# Patient Record
Sex: FEMALE | Race: WHITE | NOT HISPANIC OR LATINO | Employment: UNEMPLOYED | URBAN - METROPOLITAN AREA
[De-identification: names, ages, dates, MRNs, and addresses within clinical notes are randomized per-mention and may not be internally consistent; named-entity substitution may affect disease eponyms.]

---

## 2018-10-18 ENCOUNTER — OFFICE VISIT (OUTPATIENT)
Dept: INTERNAL MEDICINE | Facility: MEDICAL CENTER | Age: 33
End: 2018-10-18
Payer: MEDICAID

## 2018-10-18 VITALS
TEMPERATURE: 97 F | HEART RATE: 73 BPM | BODY MASS INDEX: 29.18 KG/M2 | WEIGHT: 197 LBS | DIASTOLIC BLOOD PRESSURE: 81 MMHG | OXYGEN SATURATION: 99 % | SYSTOLIC BLOOD PRESSURE: 126 MMHG | HEIGHT: 69 IN

## 2018-10-18 DIAGNOSIS — F31.9 BIPOLAR 1 DISORDER (HCC): ICD-10-CM

## 2018-10-18 DIAGNOSIS — Z23 NEED FOR VACCINATION: ICD-10-CM

## 2018-10-18 DIAGNOSIS — Z11.9 SCREENING EXAMINATION FOR INFECTIOUS DISEASE: ICD-10-CM

## 2018-10-18 DIAGNOSIS — L73.9 FOLLICULITIS: ICD-10-CM

## 2018-10-18 DIAGNOSIS — Z13.228 SCREENING FOR METABOLIC DISORDER: ICD-10-CM

## 2018-10-18 DIAGNOSIS — E66.3 OVERWEIGHT (BMI 25.0-29.9): ICD-10-CM

## 2018-10-18 DIAGNOSIS — B02.9 HERPES ZOSTER WITHOUT COMPLICATION: ICD-10-CM

## 2018-10-18 PROCEDURE — 90471 IMMUNIZATION ADMIN: CPT | Performed by: INTERNAL MEDICINE

## 2018-10-18 PROCEDURE — 99204 OFFICE O/P NEW MOD 45 MIN: CPT | Mod: GC,25 | Performed by: INTERNAL MEDICINE

## 2018-10-18 PROCEDURE — 90686 IIV4 VACC NO PRSV 0.5 ML IM: CPT | Performed by: INTERNAL MEDICINE

## 2018-10-18 ASSESSMENT — PATIENT HEALTH QUESTIONNAIRE - PHQ9
SUM OF ALL RESPONSES TO PHQ QUESTIONS 1-9: 8
CLINICAL INTERPRETATION OF PHQ2 SCORE: 2
5. POOR APPETITE OR OVEREATING: 1 - SEVERAL DAYS

## 2018-10-18 NOTE — NON-PROVIDER
"Viki Foster is a 33 y.o. female here for a non-provider visit for:   FLU    Reason for immunization: Annual Flu Vaccine  Immunization records indicate need for vaccine: Yes, confirmed with Epic  Minimum interval has been met for this vaccine: Yes  ABN completed: Not Indicated    Order and dose verified by: ABDULLAHI   VIS Dated  8/7/15 was given to patient: Yes  All IAC Questionnaire questions were answered \"No.\"    Patient tolerated injection and no adverse effects were observed or reported: Yes    Pt scheduled for next dose in series: Not Indicated    "

## 2018-10-18 NOTE — ASSESSMENT & PLAN NOTE
Patient over weight and has trouble losing the extra pounds.  - get CMP, TSH, and T4  - counseled patient on diet and exersice

## 2018-10-18 NOTE — ASSESSMENT & PLAN NOTE
Diagnosed 6-8 weeks ago at [facility] ED. Currently healing well. Has occasional itching, rarely pain. Symptoms are tolerable.  - CTM  - advised patient to use OTC lidocaine ointment PRN

## 2018-10-18 NOTE — ASSESSMENT & PLAN NOTE
Folliculitis beneath bilateral arms. Likely bacterial.  - start mupirocin ointment to bilateral underarms BID

## 2018-10-18 NOTE — ASSESSMENT & PLAN NOTE
Diagnosed as a child. Has not been on medications or seen psychiatry as an adult. Currently PHQ9 score is 8. Patient is interested in starting treatment for depression.  - referral to psychiatry provided

## 2018-10-18 NOTE — PROGRESS NOTES
New Patient to Establish    Reason to establish: New patient to establish    CC: Follow-up for herpes zoster. Under arm folliculitis. Depression.    HPI: Patient is a pleasant 33-year-old female without any diagnosis chronic health conditions. About 8 weeks ago, the patient reports that she was diagnosed with shingles in [facility] emergency room. At that time, the patient was given a prescription for gabapentin for neuropathic pain. Currently, the patient reports that her symptoms are much improved. The lesions have healed. Patient reports occasional shooting pain in the area of the lesion and occasional itching. Patient states that she just escamilla through it and it doesn't bother her much.    Patient is also concern for underarm lesion that she's had for about 6 weeks now. Patient reports that they're painful and itchy. Patient denies changes in deodorant, shower gel, shaving cream. She reports that she changes her razor on a regular basis. Has not tried anything for treatment.    Patient would also like to be started on something for depression. She reports that she was diagnosed with bipolar disorder as a child and was on Ritalin for short period of time. Patient has not seen a psychiatrist as an adult. She scoring 8 on the PHQ 9 which is which correlates with mild depression.    Patient reports that she is generally in good health and has no concern for any sexual transmitted infections. Patient is never been screened for HIV or has any recent labs done.    Patient Active Problem List    Diagnosis Date Noted   • Herpes zoster without complication 10/18/2018   • Bipolar 1 disorder (HCC) 10/18/2018   • Folliculitis 10/18/2018   • Need for vaccination 10/18/2018   • Screening examination for infectious disease 10/18/2018   • Overweight (BMI 25.0-29.9) 10/18/2018   • Screening for metabolic disorder 10/18/2018       Past Medical History:   Diagnosis Date   • Psychiatric disorder     bipolar       Current  "Outpatient Prescriptions   Medication Sig Dispense Refill   • mupirocin (BACTROBAN) 2 % Ointment Apply 1 g to affected area(s) 2 times a day. 1 Tube 0     No current facility-administered medications for this visit.        Allergies as of 10/18/2018   • (No Known Allergies)       Social History     Social History   • Marital status: Legally      Spouse name: N/A   • Number of children: N/A   • Years of education: N/A     Occupational History   • Not on file.     Social History Main Topics   • Smoking status: Current Every Day Smoker     Packs/day: 0.25     Years: 4.00     Types: Cigarettes   • Smokeless tobacco: Never Used      Comment: one to two months to finish a pack   • Alcohol use Yes      Comment: once a month    • Drug use: Yes     Types: Marijuana   • Sexual activity: Yes     Partners: Male     Other Topics Concern   • Not on file     Social History Narrative   • No narrative on file       Family History   Problem Relation Age of Onset   • No Known Problems Mother    • No Known Problems Sister    • No Known Problems Brother        Past Surgical History:   Procedure Laterality Date   • HYSTERECTOMY, VAGINAL     • PB  DELIVERY ONLY      x 3       ROS: As per HPI. Additional pertinent symptoms as noted below.    All other systems reviewed and are negative    /81 (BP Location: Left arm, Patient Position: Sitting, BP Cuff Size: Adult)   Pulse 73   Temp 36.1 °C (97 °F) (Temporal)   Ht 1.753 m (5' 9\")   Wt 89.4 kg (197 lb)   SpO2 99%   BMI 29.09 kg/m²     Physical Exam  General:  Alert and oriented, No apparent distress.    Eyes: Pupils equal and reactive. No scleral icterus.    Throat: Clear no erythema or exudates noted.    Neck: Supple. No lymphadenopathy noted. Thyroid not enlarged.    Lungs: Clear to auscultation and percussion bilaterally.    Cardiovascular: Regular rate and rhythm. No murmurs, rubs or gallops.    Abdomen:  Benign. No rebound or guarding noted.    Extremities: No " clubbing, cyanosis, edema.    Skin: Clear. No rash or suspicious skin lesions noted.    Other: Pustular lesions on erythematous base in the area of hair follicles underneath bilateral underarms. Tender to palpation.    Note: I have reviewed all pertinent labs and diagnostic tests associated with this visit with specific comments listed under the assessment and plan below    Assessment and Plan    Screening for metabolic disorder  Patient overweight and reports problems losing weight.  - get CMP, TSH and T4    Screening examination for infectious disease  Patient with shingles. Never had ID screening.  - get HIV and CBC    Overweight (BMI 25.0-29.9)  Patient over weight and has trouble losing the extra pounds.  - get CMP, TSH, and T4  - counseled patient on diet and exersice    Need for vaccination  Flu shot administered this visit.    Herpes zoster without complication  Diagnosed 6-8 weeks ago at [facility] ED. Currently healing well. Has occasional itching, rarely pain. Symptoms are tolerable.  - CTM  - advised patient to use OTC lidocaine ointment PRN    Folliculitis  Folliculitis beneath bilateral arms. Likely bacterial.  - start mupirocin ointment to bilateral underarms BID    Bipolar 1 disorder (HCC)  Diagnosed as a child. Has not been on medications or seen psychiatry as an adult. Currently PHQ9 score is 8. Patient is interested in starting treatment for depression.  - referral to psychiatry provided        Followup: Return in about 1 month (around 11/18/2018). to f/u for folliculitis and labs.      Signed by: Ivanna Amaral M.D.

## 2018-10-19 DIAGNOSIS — Z13.228 SCREENING FOR METABOLIC DISORDER: ICD-10-CM

## 2018-10-19 DIAGNOSIS — Z11.9 SCREENING EXAMINATION FOR INFECTIOUS DISEASE: ICD-10-CM

## 2018-10-30 ENCOUNTER — HOSPITAL ENCOUNTER (OUTPATIENT)
Facility: MEDICAL CENTER | Age: 33
End: 2018-10-30
Attending: NURSE PRACTITIONER
Payer: MEDICAID

## 2018-10-30 ENCOUNTER — OFFICE VISIT (OUTPATIENT)
Dept: MEDICAL GROUP | Facility: MEDICAL CENTER | Age: 33
End: 2018-10-30
Attending: NURSE PRACTITIONER
Payer: MEDICAID

## 2018-10-30 VITALS
DIASTOLIC BLOOD PRESSURE: 80 MMHG | TEMPERATURE: 98.3 F | RESPIRATION RATE: 16 BRPM | OXYGEN SATURATION: 99 % | WEIGHT: 200 LBS | HEIGHT: 69 IN | SYSTOLIC BLOOD PRESSURE: 116 MMHG | HEART RATE: 96 BPM | BODY MASS INDEX: 29.62 KG/M2

## 2018-10-30 DIAGNOSIS — R21 RASH: ICD-10-CM

## 2018-10-30 DIAGNOSIS — L08.9 SKIN INFECTION: ICD-10-CM

## 2018-10-30 LAB
FORWARD REASON: SPWHY: NORMAL
FORWARDED TO LAB: SPWHR: NORMAL
SPECIMEN SENT: SPWT1: NORMAL

## 2018-10-30 PROCEDURE — 99213 OFFICE O/P EST LOW 20 MIN: CPT | Performed by: NURSE PRACTITIONER

## 2018-10-30 PROCEDURE — 99214 OFFICE O/P EST MOD 30 MIN: CPT | Performed by: NURSE PRACTITIONER

## 2018-10-30 RX ORDER — CEPHALEXIN 250 MG/1
250 CAPSULE ORAL 3 TIMES DAILY
Qty: 21 CAP | Refills: 0 | Status: SHIPPED | OUTPATIENT
Start: 2018-10-30 | End: 2018-11-06

## 2018-10-30 RX ORDER — PREDNISONE 10 MG/1
TABLET ORAL
Qty: 8 TAB | Refills: 0 | Status: SHIPPED | OUTPATIENT
Start: 2018-10-30 | End: 2018-12-07

## 2018-10-30 RX ORDER — DOXYCYCLINE 100 MG/1
100 CAPSULE ORAL 2 TIMES DAILY
Qty: 14 CAP | Refills: 0 | Status: SHIPPED | OUTPATIENT
Start: 2018-10-30 | End: 2018-11-06

## 2018-10-30 ASSESSMENT — PAIN SCALES - GENERAL: PAINLEVEL: 8=MODERATE-SEVERE PAIN

## 2018-10-30 NOTE — ASSESSMENT & PLAN NOTE
"REports not able to see [facility] MD today so here for concern about popliteal area skin.  States 3 days ago \"bite\" to behind  Right knee. Itchy, red and whole leg.    "

## 2018-10-30 NOTE — PROGRESS NOTES
"Chief Complaint:   Chief Complaint   Patient presents with   • Rash     behind right knee, not sure if it's spider bite or eczema        HPI:  Viki is here today for same day appt as her [facility] MD is not available to   See her today.    [place]  Report:  Not checked    Her PMH includes  Smoking- tobacco  Bipolar Disorder  Shingles  Over-weight  Hysterectomy    Rash/ \"spider bite\"  REports not able to see [facility] MD today so here for concern about popliteal area skin.  States 3 days ago \"bite\" to behind  Right knee. Itchy, red and whole leg.   She believes it may have been a spider but never saw it.  States has had redness and some yellowish drainage.  Hurts for pants to touch the popliteal area or if she bends her knee too far.  Has not tried any medications but has cleaned area.  Does report she has a chronic popliteal cyst behind right knee.          Patient Active Problem List    Diagnosis Date Noted   • Rash 10/30/2018   • Herpes zoster without complication 10/18/2018   • Bipolar 1 disorder (HCC) 10/18/2018   • Folliculitis 10/18/2018   • Need for vaccination 10/18/2018   • Screening examination for infectious disease 10/18/2018   • Overweight (BMI 25.0-29.9) 10/18/2018   • Screening for metabolic disorder 10/18/2018       Allergies:Patient has no known allergies.    Medicines as of today:  Current Outpatient Prescriptions   Medication Sig Dispense Refill   • doxycycline (MONODOX) 100 MG capsule Take 1 Cap by mouth 2 times a day. 14 Cap 0   • cephALEXin (KEFLEX) 250 MG Cap Take 1 Cap by mouth 3 times a day. 21 Cap 0   • predniSONE (DELTASONE) 10 MG Tab 10 mg twice a day for 3 days, then 10 mg once a day for 2 days 8 Tab 0   • mupirocin (BACTROBAN) 2 % Ointment Apply 1 g to affected area(s) 2 times a day. 1 Tube 0     No current facility-administered medications for this visit.        Social History   Substance Use Topics   • Smoking status: Current Every Day Smoker     Packs/day: 0.25     Years: 4.00     Types: " "Cigarettes   • Smokeless tobacco: Never Used      Comment: one to two months to finish a pack   • Alcohol use Yes      Comment: once a month        Past Medical History:   Diagnosis Date   • Psychiatric disorder     bipolar       Family History   Problem Relation Age of Onset   • No Known Problems Mother    • No Known Problems Sister    • No Known Problems Brother        ROS:  Review of Systems   See HPI Above    Exam:  Blood pressure 116/80, pulse 96, temperature 36.8 °C (98.3 °F), temperature source Temporal, resp. rate 16, height 1.753 m (5' 9.02\"), weight 90.7 kg (200 lb), SpO2 99 %. Body mass index is 29.52 kg/m².    General:  Well nourished, well developed female in NAD  HENT:Head is grossly normal.  Neck: Supple. Trachea is midline.  Pulmonary: Speaks in full sentences easily..  Normal effort.  Cardiovascular: Regular rate and rhythm.  Abdomen-Abdomen is soft  Upper extremities-  wnl  Lower extremities- neg for edema, redness, tenderness except   Right popliteal space, quarter coin size redness w tenderness and light  Dried drainage slightly yellow tinged. Swab sent for Culture  Neuro- A & O x 4. Speech clear and appropriate.    Current medications, allergies, and problem list reviewed with patient and updated in [facility] today.    Assessment/Plan:  1. Rash / Itching predniSONE (DELTASONE) 10 MG Tab  May take otc Benadryl at hs for itching prn.   2. Skin infection  doxycycline (MONODOX) 100 MG capsule    cephALEXin (KEFLEX) 250 MG Cap    predniSONE (DELTASONE) 10 MG Tab    CULTURE WOUND W/ GRAM STAIN   Patient to call in 2-3 days for C and S results. INstructed if redness spreads, worsening pain or swelling, fever or chills to go directly to ER. Pt given polysporin to place on wound once to twice a day prn.    Recommend appt w your PCP at [facility] in about 2 weeks (around 11/13/2018) for follow up, sooner if worse.  "

## 2018-11-01 ENCOUNTER — TELEPHONE (OUTPATIENT)
Dept: MEDICAL GROUP | Facility: MEDICAL CENTER | Age: 33
End: 2018-11-01

## 2018-11-01 NOTE — TELEPHONE ENCOUNTER
1. Name: Viki  Call Back Number: [phone] (only until 12pm)    Patient approves a detailed voicemail message: N\A    Patient called, stated she saw Travis on 10/30/18. She would like to know test results on the swab that was done to make sure she is on the right medication.  Please call back. Patient is only at this number until noon.

## 2018-11-01 NOTE — TELEPHONE ENCOUNTER
Phone Number Called: [phone] (home)       Message: Called patient to inform her that we do not have the results yet. Once we get the results from [facility], we will call to inform her.     Left Message for patient to call back: no

## 2018-11-06 ENCOUNTER — TELEPHONE (OUTPATIENT)
Dept: MEDICAL GROUP | Facility: MEDICAL CENTER | Age: 33
End: 2018-11-06

## 2018-11-06 DIAGNOSIS — L08.9 SKIN INFECTION: ICD-10-CM

## 2018-11-06 RX ORDER — SULFAMETHOXAZOLE AND TRIMETHOPRIM 800; 160 MG/1; MG/1
1 TABLET ORAL 2 TIMES DAILY
Qty: 14 TAB | Refills: 0 | Status: SHIPPED | OUTPATIENT
Start: 2018-11-06 | End: 2018-12-07

## 2018-11-06 NOTE — TELEPHONE ENCOUNTER
1. Caller Name: Viki                                          Call Back Number: [phone] (home)         Patient approves a detailed voicemail message: yes    2. Patient is requesting lab results dated: 10/30/18    3. Results scanned into media. Patient advised they will be contacted once interpreted by provider.

## 2018-11-06 NOTE — TELEPHONE ENCOUNTER
Phone Number Called: [phone] (home)       Message: Called patient, was not available. Asked if patient can please call us back. Preliminary results shows + for staph. Patient already given antibiotics, however we are still waiting for sensitivity.     Left Message for patient to call back: yes

## 2018-11-07 NOTE — TELEPHONE ENCOUNTER
Matt Virk, [email or web address].  [facility]             Results reviewed. Please inform Crystal her Culture results showed 2 different bacteria:   Staph aureus heavy growth( bacteria normally found on skin) and Acinetobacter lwoffi in her wound.   They are both sensitive to the Antibiotic Bactrim DS, so if she is not better I have sent   RX for Bactrim DS for her to take in place of the Keflex and Doxycycline.   If it continues to be a problem she needs to see her [facility] MD.   Thanks   shellie

## 2018-11-07 NOTE — TELEPHONE ENCOUNTER
Phone Number Called: [phone] (home)       Message: Called patient, no answer. Left a detailed message about provider message. Asked her to call back and confirm she received my message.    Left Message for patient to call back: yes

## 2018-12-07 ENCOUNTER — OFFICE VISIT (OUTPATIENT)
Dept: INTERNAL MEDICINE | Facility: MEDICAL CENTER | Age: 33
End: 2018-12-07
Payer: MEDICAID

## 2018-12-07 VITALS
TEMPERATURE: 98.1 F | DIASTOLIC BLOOD PRESSURE: 84 MMHG | BODY MASS INDEX: 30.14 KG/M2 | SYSTOLIC BLOOD PRESSURE: 116 MMHG | WEIGHT: 203.5 LBS | HEIGHT: 69 IN | HEART RATE: 89 BPM | OXYGEN SATURATION: 96 %

## 2018-12-07 DIAGNOSIS — L03.115 CELLULITIS OF RIGHT LOWER EXTREMITY: ICD-10-CM

## 2018-12-07 DIAGNOSIS — Z13.1 SCREENING FOR DIABETES MELLITUS: ICD-10-CM

## 2018-12-07 PROBLEM — L03.119 CELLULITIS OF EXTREMITY: Status: ACTIVE | Noted: 2018-12-07

## 2018-12-07 LAB
HBA1C MFR BLD: 5 % (ref ?–5.8)
INT CON NEG: NORMAL
INT CON POS: NORMAL

## 2018-12-07 PROCEDURE — 83036 HEMOGLOBIN GLYCOSYLATED A1C: CPT | Mod: GC | Performed by: INTERNAL MEDICINE

## 2018-12-07 PROCEDURE — 99213 OFFICE O/P EST LOW 20 MIN: CPT | Mod: GE | Performed by: INTERNAL MEDICINE

## 2018-12-07 RX ORDER — SULFAMETHOXAZOLE AND TRIMETHOPRIM 800; 160 MG/1; MG/1
1 TABLET ORAL 2 TIMES DAILY
Qty: 14 TAB | Refills: 0 | Status: SHIPPED | OUTPATIENT
Start: 2018-12-07 | End: 2019-02-06

## 2018-12-07 NOTE — PATIENT INSTRUCTIONS
"Do not use latex containing \"stuff\" for wound covering    Use gauze for covering area of discharge from wound      "

## 2018-12-08 NOTE — PROGRESS NOTES
Established Patient    Viki presents today with the following:    CC:   Chief Complaint   Patient presents with   • Results     Culture of leg   • Wound Check     Follow up         HPI:  This is a 33-year-old female with history of bipolar disorder, who is here for follow-up.  On 10/30 patient visited urgent care on account of a rash at the right popliteal area, which patient thought he was spider bite.  Started with itching, and patient was seen and deemed to have had the area infected.  She was prescribed doxycycline and  cultures taken.  The culture grew staph aureus organisms.  Patient continues to have pain, clear discharge from the popliteal area.  She applies latex base plaster.  Patient states about 3 days ago she had a fever, she did have some chills.  Currently denies any fever nausea vomiting.  Denies any unilateral leg swelling.    Denies chest pain, shortness of breath, palpitations      ROS:  Constitutional: No fever, no chills,  Respiratory: No cough, no hemoptysis,  Cardiovascular: No chest pain, no palpitations, no orthopnea, no PND, no lower extremity swelling  GI  : No nausea, no vomiting, no abdominal pain, no diarrhea, no constipation, no hematochezia  : No dysuria, no urgency, no hesitancy, no nocturia, no frequency, no hematuria  Endocrine: No polyuria, no polydipsia,  Hematology: No easy bruisability, no bleeding  Musculoskeletal: No joint swelling, no joint redness,  Neuro: No seizures, no numbness, no tingling sensation, no extremity weakness, no change in vision, no hearing impairment  Psychiatry: no depression, no suicidal ideation        Patient Active Problem List    Diagnosis Date Noted   • Cellulitis of extremity 12/07/2018   • Rash 10/30/2018   • Herpes zoster without complication 10/18/2018   • Bipolar 1 disorder (HCC) 10/18/2018   • Folliculitis 10/18/2018   • Need for vaccination 10/18/2018   • Screening examination for infectious disease 10/18/2018   • Overweight (BMI  "25.0-29.9) 10/18/2018   • Screening for metabolic disorder 10/18/2018       Current Outpatient Prescriptions   Medication Sig Dispense Refill   • sulfamethoxazole-trimethoprim (BACTRIM DS) 800-160 MG tablet Take 1 Tab by mouth 2 times a day. 14 Tab 0     No current facility-administered medications for this visit.            /84 (BP Location: Left arm, Patient Position: Sitting, BP Cuff Size: Adult)   Pulse 89   Temp 36.7 °C (98.1 °F) (Temporal)   Ht 1.753 m (5' 9\")   Wt 92.3 kg (203 lb 8 oz)   SpO2 96%   BMI 30.05 kg/m²     Physical Exam   Constitutional:  oriented to person, place, and time. No distress.   Eyes: Pupils are equal, round, and reactive to light. No scleral icterus.  Neck: Neck supple. No thyromegaly present.   Cardiovascular: Normal rate, regular rhythm and normal heart sounds.  Exam reveals no gallop and no friction rub.  No murmur heard.  Pulmonary/Chest: Breath sounds normal. Chest wall is not dull to percussion.   Musculoskeletal:   no edema.   Physical Exam   Skin:          Area of rash, erythematous, clear discharge, mild differential warmth,  All pulses are palpable bilaterally  There is eczematous rash noted as well    Lymphadenopathy: no cervical adenopathy  Neurological: alert and oriented to person, place, and time.   Skin: No cyanosis. Nails show no clubbing.      Note: I have reviewed all pertinent labs and diagnostic tests associated with this visit with specific comments listed under the assessment and plan below    Assessment and Plan    1. Cellulitis of right lower extremity  Pain, clear discharge, redness, which started presumedly after an insect bite  Examination as above  There is components of plaster reaction (latex)  -Patient instructed to avoid latex-based plaster  -Educated to do wound dressing with clean gauze  - Will start patient on Bactrim for 7 days  -Patient instructed to follow-up within 1 week if symptoms not improving.    2. Screening for diabetes " mellitus  A1c checked, and normal: 5.0    Followup: Return for routine follow up w/ pcp.      Signed by: Jimenez Brennan M.D.

## 2018-12-13 ENCOUNTER — OFFICE VISIT (OUTPATIENT)
Dept: INTERNAL MEDICINE | Facility: MEDICAL CENTER | Age: 33
End: 2018-12-13
Payer: MEDICAID

## 2018-12-13 VITALS
OXYGEN SATURATION: 97 % | DIASTOLIC BLOOD PRESSURE: 87 MMHG | BODY MASS INDEX: 29.62 KG/M2 | HEART RATE: 87 BPM | SYSTOLIC BLOOD PRESSURE: 126 MMHG | WEIGHT: 200 LBS | HEIGHT: 69 IN | TEMPERATURE: 97.5 F

## 2018-12-13 DIAGNOSIS — L20.82 FLEXURAL ECZEMA: ICD-10-CM

## 2018-12-13 PROCEDURE — 99213 OFFICE O/P EST LOW 20 MIN: CPT | Mod: GE | Performed by: INTERNAL MEDICINE

## 2018-12-13 RX ORDER — ACETAMINOPHEN 500 MG
500-1000 TABLET ORAL EVERY 8 HOURS PRN
Qty: 90 TAB | Refills: 0 | Status: SHIPPED | OUTPATIENT
Start: 2018-12-13 | End: 2019-02-06

## 2018-12-13 RX ORDER — CLOBETASOL PROPIONATE 0.5 MG/G
1 OINTMENT TOPICAL 2 TIMES DAILY
Qty: 60 G | Refills: 0 | Status: SHIPPED | OUTPATIENT
Start: 2018-12-13 | End: 2022-11-25

## 2018-12-13 NOTE — PROGRESS NOTES
Established Patient    Viki presents today with the following:    CC: Eczematous rash on right lower extremity.    HPI: Patient is a pleasant 33-year-old female here to follow-up for right lower extremity skin lesion.  She was initially evaluated on October 30 for possible spider bite.  She received a course of antibiotics at that time as well as had a skin swab done.  The wound culture came back as staph aureus.  Patient followed up last week for a nonhealing lesion to the back of the leg and was prescribed a course of Bactrim.  At that time, it was also discovered that the patient has a latex allergy and that part of her lesion is definitely an allergic reaction.  Patient has been taking the Bactrim, today's the last day.  Patient reports no improvement from the antibiotic therapy.  She reports that the lesion is itchy and painful with pain spreading up to her mid thigh and down her calf.  Patient reports that it is painful to walk and that she has been limited in her daily activities due to the pain.    Patient Active Problem List    Diagnosis Date Noted   • Cellulitis of extremity 12/07/2018   • Rash 10/30/2018   • Herpes zoster without complication 10/18/2018   • Bipolar 1 disorder (HCC) 10/18/2018   • Folliculitis 10/18/2018   • Need for vaccination 10/18/2018   • Screening examination for infectious disease 10/18/2018   • Overweight (BMI 25.0-29.9) 10/18/2018   • Screening for metabolic disorder 10/18/2018       Current Outpatient Prescriptions   Medication Sig Dispense Refill   • clobetasol (TEMOVATE) 0.05 % Ointment Apply 1 g to affected area(s) 2 times a day. 60 g 0   • acetaminophen (TYLENOL) 500 MG Tab Take 1-2 Tabs by mouth every 8 hours as needed. 90 Tab 0   • sulfamethoxazole-trimethoprim (BACTRIM DS) 800-160 MG tablet Take 1 Tab by mouth 2 times a day. 14 Tab 0     No current facility-administered medications for this visit.        ROS: As per HPI. Additional pertinent systems as noted  "below.    All other systems reviewed and are negative.    /87 (BP Location: Right arm, Patient Position: Sitting, BP Cuff Size: Adult)   Pulse 87   Temp 36.4 °C (97.5 °F) (Temporal)   Ht 1.753 m (5' 9\")   Wt 90.7 kg (200 lb)   SpO2 97%   BMI 29.53 kg/m²     Physical Exam   Constitutional:  oriented to person, place, and time. No distress.   Eyes: Pupils are equal, round, and reactive to light. No scleral icterus.  Neck: Neck supple. No thyromegaly present.   Cardiovascular: Normal rate, regular rhythm and normal heart sounds.  Exam reveals no gallop and no friction rub.  No murmur heard.  Pulmonary/Chest: Breath sounds normal. Chest wall is not dull to percussion.   Musculoskeletal:   no edema.   Lymphadenopathy: no cervical adenopathy  Neurological: alert and oriented to person, place, and time.   Skin: A 4 cm x 3 cm rectangular lesion, crusted and cracked.  Mildly erythematous.      Note: I have reviewed all pertinent labs and diagnostic tests associated with this visit with specific comments listed under the assessment and plan below    Assessment and Plan    1.  Eczematous rash of the right lower extremity  Based on the patient's physical exam and presentation, her initial infection has likely already resolved.  Patient has a history of eczema which her current rash is consistent with.  -Start clobetasol ointment twice daily  -Apply moisturizer daily to affected area  -Counseled patient on skin care and moisturizing  -Patient instructed to follow-up in 2 weeks if her symptoms are not improved  -Advised patient to contact us next week to let us know if her symptoms are improving for possible future referral to wound clinic      Followup: Return in about 2 weeks (around 12/27/2018).      Signed by: Ivanna Amaral M.D.  "

## 2018-12-19 ENCOUNTER — TELEPHONE (OUTPATIENT)
Dept: INTERNAL MEDICINE | Facility: MEDICAL CENTER | Age: 33
End: 2018-12-19

## 2018-12-19 NOTE — TELEPHONE ENCOUNTER
DOCUMENTATION OF PAR STATUS:    1. Name of Medication & Dose: clobetasol 0.05% ointment  Apply 1g to affected areas 2 times a day     2. Name of Prescription Coverage Company & phone #: medicaid [employer] [phone]    3. Date Prior Auth Submitted: 12/19/18    4. What information was given to obtain insurance decision?     5. Prior Auth Letter Approved or Denied? Pending -preferred is Betamethasone ointment 0.05%     6. Action Taken: Pharmacy/Patient Notified: No

## 2018-12-24 NOTE — TELEPHONE ENCOUNTER
Called pt did not answer, LM with one of her colleagues to please call me back regarding the preferred medication change.

## 2018-12-26 ENCOUNTER — PATIENT MESSAGE (OUTPATIENT)
Dept: INTERNAL MEDICINE | Facility: MEDICAL CENTER | Age: 33
End: 2018-12-26

## 2018-12-26 NOTE — TELEPHONE ENCOUNTER
From: Viki Foster  To: Ivanna Amaral M.D.  Sent: 12/26/2018 2:53 PM PST  Subject: Prescription Question    Yes I need something for the pain too please

## 2019-02-04 ENCOUNTER — PATIENT MESSAGE (OUTPATIENT)
Dept: INTERNAL MEDICINE | Facility: MEDICAL CENTER | Age: 34
End: 2019-02-04

## 2019-02-05 NOTE — TELEPHONE ENCOUNTER
From: Viki Foster  To: Ivanna Amaral M.D.  Sent: 2/4/2019 4:10 PM PST  Subject: Non-Urgent Medical Question    The Shingles come back. What can we do about it. I am in a lot of pain.

## 2019-02-06 ENCOUNTER — OFFICE VISIT (OUTPATIENT)
Dept: INTERNAL MEDICINE | Facility: MEDICAL CENTER | Age: 34
End: 2019-02-06
Payer: MEDICAID

## 2019-02-06 VITALS
HEART RATE: 82 BPM | OXYGEN SATURATION: 95 % | WEIGHT: 210.6 LBS | DIASTOLIC BLOOD PRESSURE: 69 MMHG | HEIGHT: 69 IN | TEMPERATURE: 97.8 F | BODY MASS INDEX: 31.19 KG/M2 | SYSTOLIC BLOOD PRESSURE: 109 MMHG

## 2019-02-06 DIAGNOSIS — L30.8 OTHER ECZEMA: ICD-10-CM

## 2019-02-06 DIAGNOSIS — Z86.19 HISTORY OF HERPES ZOSTER: ICD-10-CM

## 2019-02-06 DIAGNOSIS — L30.9 DERMATITIS: ICD-10-CM

## 2019-02-06 PROCEDURE — 99213 OFFICE O/P EST LOW 20 MIN: CPT | Mod: GE | Performed by: INTERNAL MEDICINE

## 2019-02-06 RX ORDER — VALACYCLOVIR HYDROCHLORIDE 1 G/1
1000 TABLET, FILM COATED ORAL 3 TIMES DAILY
Qty: 9 TAB | Refills: 1 | Status: SHIPPED | OUTPATIENT
Start: 2019-02-06 | End: 2019-02-09

## 2019-02-06 RX ORDER — GABAPENTIN 100 MG/1
100 CAPSULE ORAL 3 TIMES DAILY
Qty: 180 CAP | Refills: 0 | Status: SHIPPED | OUTPATIENT
Start: 2019-02-06 | End: 2019-02-06

## 2019-02-06 ASSESSMENT — PAIN SCALES - GENERAL: PAINLEVEL: 7=MODERATE-SEVERE PAIN

## 2019-02-06 NOTE — PROGRESS NOTES
"      Established Patient    Viki presents today with the following:    CC: Rash on the neck    HPI: 33-year-old female with no significant medical history other than \"rashes\" on her skin presented to the office for evaluation of rash on her neck.    Patient thinks the rash is shingles, its on her right shoulder/neck area erythematous base with crusting which itches and hurt.  Current rash goes and come back.  Denies any burning pain, fevers, chills.     she has a history of rash from past 6 months other rashes were on the other side of neck and at the back of the knee.  Both of these rashes were of similar characteristic which itches and hurt.  Other to rash is resolved with the application of steroid cream and ?  Antibiotic.    Patient uses Dial product and other cosmetic product.  She did not want to make any change on her toiletry/cosmetics as she do not have money to buy any new products.        Patient Active Problem List    Diagnosis Date Noted   • Cellulitis of extremity 12/07/2018   • Rash 10/30/2018   • Herpes zoster without complication 10/18/2018   • Bipolar 1 disorder (HCC) 10/18/2018   • Folliculitis 10/18/2018   • Need for vaccination 10/18/2018   • Screening examination for infectious disease 10/18/2018   • Overweight (BMI 25.0-29.9) 10/18/2018   • Screening for metabolic disorder 10/18/2018       Current Outpatient Prescriptions   Medication Sig Dispense Refill   • valacyclovir (VALTREX) 1 GM Tab Take 1 Tab by mouth 3 times a day for 3 days. 9 Tab 1   • clobetasol (TEMOVATE) 0.05 % Ointment Apply 1 g to affected area(s) 2 times a day. 60 g 0     No current facility-administered medications for this visit.        ROS: As per HPI. Additional pertinent systems as noted below.  Constitutional: Negative for chills and fever.   HENT: Negative for ear pain and sore throat.    Eyes: Negative for discharge and redness.   Respiratory: Negative for cough, hemoptysis, wheezing and stridor.    Cardiovascular: " "Negative for chest pain, palpitations and leg swelling.   Gastrointestinal: Negative for abdominal pain, constipation, diarrhea, heartburn, nausea and vomiting.   Genitourinary: Negative for dysuria, flank pain and hematuria.   Musculoskeletal: Negative for falls and myalgias.   Skin: Negative for itching and rash.   Neurological: Negative for dizziness, seizures, loss of consciousness and headaches.   Endo/Heme/Allergies: Negative for polydipsia. Does not bruise/bleed easily.   Psychiatric/Behavioral: Negative for substance abuse and suicidal ideas.       /69 (BP Location: Left arm, Patient Position: Sitting)   Pulse 82   Temp 36.6 °C (97.8 °F) (Temporal)   Ht 1.753 m (5' 9\")   Wt 95.5 kg (210 lb 9.6 oz)   SpO2 95%   BMI 31.10 kg/m²     Physical Exam   Constitutional:  oriented to person, place, and time. No distress.   Eyes: Pupils are equal, round, and reactive to light. No scleral icterus.  Neck: Neck supple. No thyromegaly present.   Cardiovascular: Normal rate, regular rhythm and normal heart sounds.  Exam reveals no gallop and no friction rub.  No murmur heard.  Pulmonary/Chest: Breath sounds normal. Chest wall is not dull to percussion.   Musculoskeletal:   no edema.   Lymphadenopathy: no cervical adenopathy  Neurological: alert and oriented to person, place, and time.   Skin: Right neck-2X 3 cm of erythematous base with yellowish crusting.  No discharge no oozing.  No vesicles seen    Note: I have reviewed all pertinent labs and diagnostic tests associated with this visit with specific comments listed under the assessment and plan below    Assessment and Plan    1. Dermatitis  2. Other eczema  3. History of herpes zoster    -Likelihood of having shingles is very low given her age, immunocompetent state and involvement of nonvesicular rash at multiple sites including shoulders and knee.  Previous response to steroid, itchiness without burning throbbing stabbing pain.   - The rash appears like " eczematous lesions/dermatitis.  Which has showed improvement with steroid cream in the past.  Counseled patient on changing her products to non-fragnanced one. however, patient appears fixated with shingles-we will give her valacyclovir for 3 days given the great safety profile and non-toxicity of the drug.  Continue using steroid cream on the rash.  -Referral to dermatology for evaluation of the rash and management      Followup: Return in about 4 weeks (around 3/6/2019) for with PCP .      Signed by: Stephy Oneil M.D.

## 2019-04-09 ENCOUNTER — OFFICE VISIT (OUTPATIENT)
Dept: INTERNAL MEDICINE | Facility: MEDICAL CENTER | Age: 34
End: 2019-04-09
Payer: MEDICAID

## 2019-04-09 VITALS — WEIGHT: 206.4 LBS | BODY MASS INDEX: 30.57 KG/M2 | HEIGHT: 69 IN

## 2019-04-09 DIAGNOSIS — L29.9 PRURITUS: ICD-10-CM

## 2019-04-09 DIAGNOSIS — L20.84 INTRINSIC ATOPIC DERMATITIS: ICD-10-CM

## 2019-04-09 PROCEDURE — 99202 OFFICE O/P NEW SF 15 MIN: CPT | Performed by: DERMATOLOGY

## 2019-04-09 NOTE — PROGRESS NOTES
"Viki Foster  1985  [id number]    Consultation             Vitals:    19 0916   Weight: 93.6 kg (206 lb 6.4 oz)   Height: 1.753 m (5' 9\")       Chief Complaint   Patient presents with   • Dermatitis     New patient-all over      ___________________________________________________________________            History of Present Illness (HPI) c/o rash on neck and shoulders and knees in past few months. Pt was concerned shingles, but rash has recurred several times and not in single dermatome. Rash is now better. Topical steroids clobetasol have helped. No seasonal allergies or asthma in patient. No FH of seasonal allergies or asthma.          Dr. Oneil has referred for a consultation to evaluate rash    Current Outpatient Prescriptions on File Prior to Visit   Medication Sig Dispense Refill   • clobetasol (TEMOVATE) 0.05 % Ointment Apply 1 g to affected area(s) 2 times a day. (Patient not taking: Reported on 2019) 60 g 0     No current facility-administered medications on file prior to visit.      Latex      Review of Systems:        General: Denies fever      Hematologic: no excessive bleeding problems              Past Medical History:   Diagnosis Date   • Psychiatric disorder     bipolar     Skin Cancer no h/o    Social History   Substance Use Topics   • Smoking status: Former Smoker     Packs/day: 0.25     Years: 4.00     Types: Cigarettes   • Smokeless tobacco: Never Used      Comment: one to two months to finish a pack/ quit 2018   • Alcohol use Yes      Comment: once a month      Sunscreen Use:Yes    Past Surgical History:   Procedure Laterality Date   • HYSTERECTOMY, VAGINAL     • PB  DELIVERY ONLY      x 3           Family History   Problem Relation Age of Onset   • No Known Problems Mother    • No Known Problems Sister    • No Known Problems Brother          Physical Exam:   Constitutional: Well nourished, NAD, pleasant  alert and cooperative; normal mood and affect; " normal attention span and concentration.    Skin Full body exam done: no suspicious lesions on eyelids, lips, face, ears, neck, chest, back, abdomen, upper extremities, fingernails, lower extremities except as noted   Mild fading eczematous patches on R side of neck and R popliteal fossae.   No rash on back, chest, abdomen, arms, L leg                  Assessment and Plan:   1. Intrinsic atopic dermatitis  Pt is better. Has h/o possible nickel allergy to earrings. Recent tongue piercing for 1 year, but probable stainless steel. Explained pt probably has eczema with more sensitive skin. Use clobetasol sparingly (risk of thin skin) and can continue to moisturize several times a day. F/u with PCP.     2. Pruritus  Still itching a little. Just continue to moisturize.     Rhina Sauceda M.D.   Return if symptoms worsen or fail to improve.

## 2019-12-20 ENCOUNTER — HOSPITAL ENCOUNTER (EMERGENCY)
Dept: HOSPITAL 8 - ED | Age: 34
Discharge: HOME | End: 2019-12-20
Payer: SELF-PAY

## 2019-12-20 VITALS — WEIGHT: 189.6 LBS | BODY MASS INDEX: 28.08 KG/M2 | HEIGHT: 69 IN

## 2019-12-20 VITALS — SYSTOLIC BLOOD PRESSURE: 123 MMHG | DIASTOLIC BLOOD PRESSURE: 72 MMHG

## 2019-12-20 DIAGNOSIS — L01.01: Primary | ICD-10-CM

## 2019-12-20 DIAGNOSIS — Z90.710: ICD-10-CM

## 2019-12-20 PROCEDURE — 99283 EMERGENCY DEPT VISIT LOW MDM: CPT

## 2019-12-20 NOTE — NUR
PATIENT BROUGHT BACK FROM TRIAGE WITH CHIEFT COMPLAINT OF RASH, & N/V FOR ONE 
MONTH. PATIENT REPORTS BEING EVALUATED AT [facility], HOWEVER RELEASED WITH NO 
INTERVENTIONS. THE PATIENT IS ALERT ORIENTED WARM AND DRY. 



RASH LOCATED ON CHEST, ARMPITS AND BACK OF KNEES.

## 2019-12-20 NOTE — NUR
Patient dc'd prior to being given mediation that was later tubed from pharmacy. 
Called patients phone number that is on file and left message telling her she 
can return to the ED and get this medication that will be located at the charge 
RN desk.

## 2020-01-06 ENCOUNTER — HOSPITAL ENCOUNTER (EMERGENCY)
Dept: HOSPITAL 8 - ED | Age: 35
Discharge: HOME | End: 2020-01-06
Payer: SELF-PAY

## 2020-01-06 VITALS — DIASTOLIC BLOOD PRESSURE: 84 MMHG | SYSTOLIC BLOOD PRESSURE: 111 MMHG

## 2020-01-06 VITALS — BODY MASS INDEX: 27.89 KG/M2 | WEIGHT: 188.27 LBS | HEIGHT: 69 IN

## 2020-01-06 DIAGNOSIS — L03.115: Primary | ICD-10-CM

## 2020-01-06 DIAGNOSIS — L20.84: ICD-10-CM

## 2020-01-06 PROCEDURE — 99283 EMERGENCY DEPT VISIT LOW MDM: CPT

## 2020-01-06 NOTE — NUR
AMBULATORY TO ED ROOM 30 W/ STEADY GAIT.  PT REPORTS SHE'S BEEN TO ALL 3 
HOSPITALS AND NO ONE CAN FIGURE OUT WHAT THIS IS; HAS BEEN TO [facility] TWICE - 
LAST VISIT DECMEBER 2019.  SCABBED LESION POSTERIOR RT KNEE, BETWEEN BREASTS 
AND BASE OF NECK BILAT.  STARTED ABOUT 1-1/2 MONTHS AGO.  APPLIED ANTIBIOTIC 
CREAM - UNKNOWN NAME.  REPORTS SHE WAS DX'D W/ SHINGLES LAST YEAR.  RESP EVEN & 
UNLABORED, SPEECH CLEAR, SKIN OTHERWISE WNL.  PT'S "ADOPTED SISTER" IN ROOM.

## 2020-01-06 NOTE — NUR
received report from Lisbeth. pt upright on gurney awake & calm, responds approp 
to staff, comfort measures provided, friend at BS, call light within reach. Dr Jeffers at BS.

## 2020-01-06 NOTE — NUR
Patient given wound care, discharge instructions and Rx, they have confirmed 
that they understand the instructions.  Patient ambulatory with steady gait.

## 2022-11-25 ENCOUNTER — HOSPITAL ENCOUNTER (EMERGENCY)
Facility: MEDICAL CENTER | Age: 37
End: 2022-11-25
Attending: EMERGENCY MEDICINE
Payer: MEDICAID

## 2022-11-25 ENCOUNTER — APPOINTMENT (OUTPATIENT)
Dept: RADIOLOGY | Facility: MEDICAL CENTER | Age: 37
End: 2022-11-25
Attending: EMERGENCY MEDICINE
Payer: MEDICAID

## 2022-11-25 ENCOUNTER — APPOINTMENT (OUTPATIENT)
Dept: RADIOLOGY | Facility: MEDICAL CENTER | Age: 37
End: 2022-11-25
Payer: MEDICAID

## 2022-11-25 VITALS
HEART RATE: 96 BPM | HEIGHT: 69 IN | WEIGHT: 206.57 LBS | TEMPERATURE: 98.4 F | DIASTOLIC BLOOD PRESSURE: 65 MMHG | RESPIRATION RATE: 18 BRPM | OXYGEN SATURATION: 96 % | SYSTOLIC BLOOD PRESSURE: 113 MMHG | BODY MASS INDEX: 30.6 KG/M2

## 2022-11-25 DIAGNOSIS — R07.89 CHEST WALL PAIN: ICD-10-CM

## 2022-11-25 DIAGNOSIS — R07.89 OTHER CHEST PAIN: ICD-10-CM

## 2022-11-25 LAB
ALBUMIN SERPL BCP-MCNC: 4.4 G/DL (ref 3.2–4.9)
ALBUMIN/GLOB SERPL: 1.6 G/DL
ALP SERPL-CCNC: 93 U/L (ref 30–99)
ALT SERPL-CCNC: 16 U/L (ref 2–50)
ANION GAP SERPL CALC-SCNC: 13 MMOL/L (ref 7–16)
AST SERPL-CCNC: 13 U/L (ref 12–45)
BASOPHILS # BLD AUTO: 0.3 % (ref 0–1.8)
BASOPHILS # BLD: 0.03 K/UL (ref 0–0.12)
BILIRUB SERPL-MCNC: 0.3 MG/DL (ref 0.1–1.5)
BUN SERPL-MCNC: 12 MG/DL (ref 8–22)
CALCIUM SERPL-MCNC: 9.2 MG/DL (ref 8.5–10.5)
CHLORIDE SERPL-SCNC: 105 MMOL/L (ref 96–112)
CO2 SERPL-SCNC: 22 MMOL/L (ref 20–33)
CREAT SERPL-MCNC: 0.6 MG/DL (ref 0.5–1.4)
EKG IMPRESSION: NORMAL
EOSINOPHIL # BLD AUTO: 0.29 K/UL (ref 0–0.51)
EOSINOPHIL NFR BLD: 2.8 % (ref 0–6.9)
ERYTHROCYTE [DISTWIDTH] IN BLOOD BY AUTOMATED COUNT: 40.2 FL (ref 35.9–50)
GFR SERPLBLD CREATININE-BSD FMLA CKD-EPI: 120 ML/MIN/1.73 M 2
GLOBULIN SER CALC-MCNC: 2.8 G/DL (ref 1.9–3.5)
GLUCOSE SERPL-MCNC: 96 MG/DL (ref 65–99)
HCT VFR BLD AUTO: 43.9 % (ref 37–47)
HGB BLD-MCNC: 14.8 G/DL (ref 12–16)
IMM GRANULOCYTES # BLD AUTO: 0.04 K/UL (ref 0–0.11)
IMM GRANULOCYTES NFR BLD AUTO: 0.4 % (ref 0–0.9)
LYMPHOCYTES # BLD AUTO: 2.65 K/UL (ref 1–4.8)
LYMPHOCYTES NFR BLD: 25.7 % (ref 22–41)
MCH RBC QN AUTO: 29 PG (ref 27–33)
MCHC RBC AUTO-ENTMCNC: 33.7 G/DL (ref 33.6–35)
MCV RBC AUTO: 86.1 FL (ref 81.4–97.8)
MONOCYTES # BLD AUTO: 0.69 K/UL (ref 0–0.85)
MONOCYTES NFR BLD AUTO: 6.7 % (ref 0–13.4)
NEUTROPHILS # BLD AUTO: 6.62 K/UL (ref 2–7.15)
NEUTROPHILS NFR BLD: 64.1 % (ref 44–72)
NRBC # BLD AUTO: 0 K/UL
NRBC BLD-RTO: 0 /100 WBC
PLATELET # BLD AUTO: 306 K/UL (ref 164–446)
PMV BLD AUTO: 8.8 FL (ref 9–12.9)
POTASSIUM SERPL-SCNC: 3.8 MMOL/L (ref 3.6–5.5)
PROT SERPL-MCNC: 7.2 G/DL (ref 6–8.2)
RBC # BLD AUTO: 5.1 M/UL (ref 4.2–5.4)
SODIUM SERPL-SCNC: 140 MMOL/L (ref 135–145)
TROPONIN T SERPL-MCNC: <6 NG/L (ref 6–19)
TROPONIN T SERPL-MCNC: <6 NG/L (ref 6–19)
WBC # BLD AUTO: 10.3 K/UL (ref 4.8–10.8)

## 2022-11-25 PROCEDURE — 71045 X-RAY EXAM CHEST 1 VIEW: CPT

## 2022-11-25 PROCEDURE — 85025 COMPLETE CBC W/AUTO DIFF WBC: CPT

## 2022-11-25 PROCEDURE — 84484 ASSAY OF TROPONIN QUANT: CPT

## 2022-11-25 PROCEDURE — 36415 COLL VENOUS BLD VENIPUNCTURE: CPT

## 2022-11-25 PROCEDURE — 80053 COMPREHEN METABOLIC PANEL: CPT

## 2022-11-25 PROCEDURE — 99284 EMERGENCY DEPT VISIT MOD MDM: CPT

## 2022-11-25 PROCEDURE — 93005 ELECTROCARDIOGRAM TRACING: CPT | Performed by: EMERGENCY MEDICINE

## 2022-11-25 PROCEDURE — A9270 NON-COVERED ITEM OR SERVICE: HCPCS | Performed by: EMERGENCY MEDICINE

## 2022-11-25 PROCEDURE — 700102 HCHG RX REV CODE 250 W/ 637 OVERRIDE(OP): Performed by: EMERGENCY MEDICINE

## 2022-11-25 PROCEDURE — 93005 ELECTROCARDIOGRAM TRACING: CPT

## 2022-11-25 RX ORDER — ACETAMINOPHEN 325 MG/1
650 TABLET ORAL ONCE
Status: COMPLETED | OUTPATIENT
Start: 2022-11-25 | End: 2022-11-25

## 2022-11-25 RX ORDER — ASPIRIN 500 MG
500 TABLET ORAL
Status: SHIPPED | COMMUNITY
End: 2023-01-06

## 2022-11-25 RX ORDER — HYDROCODONE BITARTRATE AND ACETAMINOPHEN 5; 325 MG/1; MG/1
1 TABLET ORAL ONCE
Status: COMPLETED | OUTPATIENT
Start: 2022-11-25 | End: 2022-11-25

## 2022-11-25 RX ADMIN — HYDROCODONE BITARTRATE AND ACETAMINOPHEN 1 TABLET: 5; 325 TABLET ORAL at 17:40

## 2022-11-25 RX ADMIN — LIDOCAINE HYDROCHLORIDE 30 ML: 20 SOLUTION OROPHARYNGEAL at 17:22

## 2022-11-25 RX ADMIN — ACETAMINOPHEN 650 MG: 325 TABLET, FILM COATED ORAL at 20:10

## 2022-11-25 ASSESSMENT — PAIN DESCRIPTION - PAIN TYPE
TYPE: ACUTE PAIN
TYPE: ACUTE PAIN

## 2022-11-26 NOTE — ED NOTES
"Chief Complaint   Patient presents with    Chest Pain     7/10 substernal chest pain x 1 hour, (+) nausea, (+) SOB     /86   Pulse 99   Temp 36.7 °C (98 °F) (Tympanic)   Resp 14   Ht 1.753 m (5' 9\")   Wt 93.7 kg (206 lb 9.1 oz)   SpO2 99%   BMI 30.51 kg/m²     Ambulatory to triage for above, EKG done, chest pain protocol ordered, patient to lobby.    "

## 2022-11-26 NOTE — ED PROVIDER NOTES
ED Provider Note    CHIEF COMPLAINT  Chief Complaint   Patient presents with    Chest Pain     7/10 substernal chest pain x 1 hour, (+) nausea, (+) SOB       HPI  Viki Foster is a 37 y.o. female who presents for retrosternal chest pain onset about an hour and a half ago.  Severity 8 of 10.  Associated with nausea and shortness of breath but not sweats.  Pain is pleuritic and worse with torso movement.  She has had approximately 4 other episodes of pain over the last month that was similar but did not last this long.  Those other episodes lasted about an hour.  No trauma.  No heartburn or gastritis.  No fever or cough.  No leg swelling calf pain DVT PE family history of thromboembolic disease hormone use recent surgery immobilization or travel.    REVIEW OF SYSTEMS  Pertinent positives include: Chest pain, worse with movement and inspiration.  Occasionally smokes  Pertinent negatives include: Abdominal pain, fever, cough, leg swelling, calf pain, hypertension, diabetes, dyslipidemia, cocaine or methamphetamine use.  10+ systems reviewed and negative.      PAST MEDICAL HISTORY  Past Medical History:   Diagnosis Date    Psychiatric disorder     bipolar       FAMILY HISTORY  Family History   Problem Relation Age of Onset    No Known Problems Mother     No Known Problems Sister     No Known Problems Brother        SOCIAL HISTORY  Social History     Tobacco Use    Smoking status: Former     Packs/day: 0.25     Years: 4.00     Pack years: 1.00     Types: Cigarettes    Smokeless tobacco: Never    Tobacco comments:     one to two months to finish a pack/ quit 2018   Substance Use Topics    Alcohol use: Yes     Comment: once a month     Drug use: Yes     Types: Marijuana     Comment: edibles      Social History     Substance and Sexual Activity   Drug Use Yes    Types: Marijuana    Comment: edibles        SURGICAL HISTORY  Past Surgical History:   Procedure Laterality Date    HYSTERECTOMY, VAGINAL      [facility]   "DELIVERY ONLY      x 3       CURRENT MEDICATIONS    Current Outpatient Medications   Medication Sig Dispense Refill    clobetasol (TEMOVATE) 0.05 % Ointment Apply 1 g to affected area(s) 2 times a day. (Patient not taking: Reported on 4/9/2019) 60 g 0       ALLERGIES  Allergies   Allergen Reactions    Latex        PHYSICAL EXAM  VITAL SIGNS: /86   Pulse 99   Temp 36.7 °C (98 °F) (Tympanic)   Resp 14   Ht 1.753 m (5' 9\")   Wt 93.7 kg (206 lb 9.1 oz)   SpO2 99%   BMI 30.51 kg/m²   Reviewed and afebrile, no tachypnea, no tachycardia, no hypoxia room air  Constitutional: Well developed, Well nourished, ill appearing.  HENT: Normocephalic, atraumatic, bilateral external ears normal, Wearing mask.   Eyes: PERRLA, conjunctiva pink, no scleral icterus.   Cardiovascular: Regular S1-S2 without murmur, rub, gallop.  No dependent edema or calf tenderness.  Respiratory: No rales, rhonchi, wheeze or cough.  Moderate sternal tenderness which reproduces symptoms  Gastrointestinal: Soft, nontender, nondistended, no organomegaly.  Skin: No erythema, no rash.   Genitourinary:  No costovertebral angle tenderness.   Neurologic: Alert & oriented x 3, cranial nerves 2-12 intact by passive exam.  No focal deficit noted.  Psychiatric: Affect normal, Judgment normal, Mood normal.     DIFFERENTIAL DIAGNOSIS:  Chest wall pain, costochondritis, pleuritis, pericarditis, doubt myocardial ischemia, pneumonia, pneumothorax, GERD, esophageal spasm.    EKG  EKG Interpretation 5:15 PM    Interpreted by me.  Indication: Chest pain    Rhythm: normal sinus   Rate: Normal at 98  Axis: normal  Ectopy: none  Conduction: normal  ST/T Waves: no acute change  Q Waves: none  R Wave progression: normal    Clinical Impression: Normal sinus rhythm    RADIOLOGY/PROCEDURES  DX-CHEST-PORTABLE (1 VIEW)   Final Result         1. No acute cardiopulmonary abnormalities are identified.          LABORATORY: Full chest pain work-up ordered by nursing triage " protocol  Results for orders placed or performed during the hospital encounter of 11/25/22   CBC with Differential   Result Value Ref Range    WBC 10.3 4.8 - 10.8 K/uL    RBC 5.10 4.20 - 5.40 M/uL    Hemoglobin 14.8 12.0 - 16.0 g/dL    Hematocrit 43.9 37.0 - 47.0 %    MCV 86.1 81.4 - 97.8 fL    MCH 29.0 27.0 - 33.0 pg    MCHC 33.7 33.6 - 35.0 g/dL    RDW 40.2 35.9 - 50.0 fL    Platelet Count 306 164 - 446 K/uL    MPV 8.8 (L) 9.0 - 12.9 fL    Neutrophils-Polys 64.10 44.00 - 72.00 %    Lymphocytes 25.70 22.00 - 41.00 %    Monocytes 6.70 0.00 - 13.40 %    Eosinophils 2.80 0.00 - 6.90 %    Basophils 0.30 0.00 - 1.80 %    Immature Granulocytes 0.40 0.00 - 0.90 %    Nucleated RBC 0.00 /100 WBC    Neutrophils (Absolute) 6.62 2.00 - 7.15 K/uL    Lymphs (Absolute) 2.65 1.00 - 4.80 K/uL    Monos (Absolute) 0.69 0.00 - 0.85 K/uL    Eos (Absolute) 0.29 0.00 - 0.51 K/uL    Baso (Absolute) 0.03 0.00 - 0.12 K/uL    Immature Granulocytes (abs) 0.04 0.00 - 0.11 K/uL    NRBC (Absolute) 0.00 K/uL   Complete Metabolic Panel (CMP)   Result Value Ref Range    Sodium 140 135 - 145 mmol/L    Potassium 3.8 3.6 - 5.5 mmol/L    Chloride 105 96 - 112 mmol/L    Co2 22 20 - 33 mmol/L    Anion Gap 13.0 7.0 - 16.0    Glucose 96 65 - 99 mg/dL    Bun 12 8 - 22 mg/dL    Creatinine 0.60 0.50 - 1.40 mg/dL    Calcium 9.2 8.5 - 10.5 mg/dL    AST(SGOT) 13 12 - 45 U/L    ALT(SGPT) 16 2 - 50 U/L    Alkaline Phosphatase 93 30 - 99 U/L    Total Bilirubin 0.3 0.1 - 1.5 mg/dL    Albumin 4.4 3.2 - 4.9 g/dL    Total Protein 7.2 6.0 - 8.2 g/dL    Globulin 2.8 1.9 - 3.5 g/dL    A-G Ratio 1.6 g/dL   Troponins NOW   Result Value Ref Range    Troponin T <6 6 - 19 ng/L   ESTIMATED GFR   Result Value Ref Range    GFR (CKD-EPI) 120 >60 mL/min/1.73 m 2   TROPONIN   Result Value Ref Range    Troponin T <6 6 - 19 ng/L       INTERVENTIONS:  Medications   HYDROcodone-acetaminophen (NORCO) 5-325 MG per tablet 1 Tablet (1 Tablet Oral Given 11/25/22 9940)   GI Cocktail  (hyoscyamine-lidocaine-Maalox) oral susp cup 30 mL (30 mL Oral Given 11/25/22 1722)   acetaminophen (Tylenol) tablet 650 mg (650 mg Oral Given 11/25/22 2010)     Response: No benefit to GI cocktail.    COURSE & MEDICAL DECISION MAKING  Well-appearing patient presents with chest pain worse with movement and palpation.  This is likely a chest wall pain either costochondritis, pleuritis, pericarditis or intercostal muscle strain.  Her symptoms do not appear to be dyspeptic.  There is no evidence of pneumonia pneumothorax or myocardial ischemia.  Her heart score is 0 which is very low risk.  She is low risk for PE by PERC criteria.    PLAN:  Reassurance  Ibuprofen and Tylenol    Nonspecific chest pain handout given  Return for changing chest pain shortness of breath leg swelling or other concerning symptoms    [facility]  [address] [place] [id number] [phone]  Schedule an appointment as soon as possible for a visit   For a new primary    CONDITION: Stable.    FINAL IMPRESSION  1. Other chest pain    2. Chest wall pain          Electronically signed by: Jimenez March M.D., 11/25/2022 5:13 PM

## 2022-11-26 NOTE — DISCHARGE INSTRUCTIONS
We could not find a dangerous cause of your pain.  It appears to be a chest wall pain.  Please take ibuprofen 600 mg 3-4 times a day unless it upsets his stomach.  Add Tylenol for persistent pain.  Follow-up with [facility] family medicine for new primary doctor.  Return to the ER for changing chest pain, new shortness of breath, leg swelling or other potentially concerning symptoms.

## 2022-11-26 NOTE — ED NOTES
Med rec completed per patient at bedside.  Allergies reviewed with patient.  Patient's preferred pharmacy: [facility] on [address].     Patient denies using any prescription medications at home.  No vitamins or supplements.  No outpatient antibiotics in the last 30 days.

## 2022-11-26 NOTE — ED NOTES
Pt ambulatory to red 7 with steady gait. Pt states her chest pain has been on and off for 1 month and decided to come in today since it wasn't going away this time.

## 2023-01-06 ENCOUNTER — HOSPITAL ENCOUNTER (EMERGENCY)
Facility: MEDICAL CENTER | Age: 38
End: 2023-01-06
Attending: EMERGENCY MEDICINE
Payer: MEDICAID

## 2023-01-06 ENCOUNTER — APPOINTMENT (OUTPATIENT)
Dept: RADIOLOGY | Facility: MEDICAL CENTER | Age: 38
End: 2023-01-06
Attending: EMERGENCY MEDICINE
Payer: MEDICAID

## 2023-01-06 VITALS
TEMPERATURE: 96.7 F | BODY MASS INDEX: 30.96 KG/M2 | WEIGHT: 209.66 LBS | RESPIRATION RATE: 18 BRPM | SYSTOLIC BLOOD PRESSURE: 129 MMHG | DIASTOLIC BLOOD PRESSURE: 73 MMHG | HEART RATE: 84 BPM | OXYGEN SATURATION: 95 %

## 2023-01-06 DIAGNOSIS — F41.0 PANIC ATTACK: ICD-10-CM

## 2023-01-06 DIAGNOSIS — F43.0 STRESS REACTION: ICD-10-CM

## 2023-01-06 DIAGNOSIS — R07.89 CHEST WALL PAIN: ICD-10-CM

## 2023-01-06 LAB
ALBUMIN SERPL BCP-MCNC: 4.6 G/DL (ref 3.2–4.9)
ALBUMIN/GLOB SERPL: 1.5 G/DL
ALP SERPL-CCNC: 90 U/L (ref 30–99)
ALT SERPL-CCNC: 16 U/L (ref 2–50)
ANION GAP SERPL CALC-SCNC: 9 MMOL/L (ref 7–16)
AST SERPL-CCNC: 16 U/L (ref 12–45)
BASOPHILS # BLD AUTO: 0.4 % (ref 0–1.8)
BASOPHILS # BLD: 0.03 K/UL (ref 0–0.12)
BILIRUB SERPL-MCNC: 0.2 MG/DL (ref 0.1–1.5)
BUN SERPL-MCNC: 11 MG/DL (ref 8–22)
CALCIUM ALBUM COR SERPL-MCNC: 9.2 MG/DL (ref 8.5–10.5)
CALCIUM SERPL-MCNC: 9.7 MG/DL (ref 8.5–10.5)
CHLORIDE SERPL-SCNC: 102 MMOL/L (ref 96–112)
CO2 SERPL-SCNC: 26 MMOL/L (ref 20–33)
CREAT SERPL-MCNC: 0.67 MG/DL (ref 0.5–1.4)
EKG IMPRESSION: NORMAL
EOSINOPHIL # BLD AUTO: 0.27 K/UL (ref 0–0.51)
EOSINOPHIL NFR BLD: 3.2 % (ref 0–6.9)
ERYTHROCYTE [DISTWIDTH] IN BLOOD BY AUTOMATED COUNT: 40.4 FL (ref 35.9–50)
GFR SERPLBLD CREATININE-BSD FMLA CKD-EPI: 116 ML/MIN/1.73 M 2
GLOBULIN SER CALC-MCNC: 3 G/DL (ref 1.9–3.5)
GLUCOSE SERPL-MCNC: 106 MG/DL (ref 65–99)
HCT VFR BLD AUTO: 44.9 % (ref 37–47)
HGB BLD-MCNC: 15.2 G/DL (ref 12–16)
IMM GRANULOCYTES # BLD AUTO: 0.04 K/UL (ref 0–0.11)
IMM GRANULOCYTES NFR BLD AUTO: 0.5 % (ref 0–0.9)
LYMPHOCYTES # BLD AUTO: 2.6 K/UL (ref 1–4.8)
LYMPHOCYTES NFR BLD: 30.5 % (ref 22–41)
MCH RBC QN AUTO: 28.9 PG (ref 27–33)
MCHC RBC AUTO-ENTMCNC: 33.9 G/DL (ref 33.6–35)
MCV RBC AUTO: 85.4 FL (ref 81.4–97.8)
MONOCYTES # BLD AUTO: 0.55 K/UL (ref 0–0.85)
MONOCYTES NFR BLD AUTO: 6.5 % (ref 0–13.4)
NEUTROPHILS # BLD AUTO: 5.03 K/UL (ref 2–7.15)
NEUTROPHILS NFR BLD: 58.9 % (ref 44–72)
NRBC # BLD AUTO: 0 K/UL
NRBC BLD-RTO: 0 /100 WBC
PLATELET # BLD AUTO: 324 K/UL (ref 164–446)
PMV BLD AUTO: 8.7 FL (ref 9–12.9)
POTASSIUM SERPL-SCNC: 3.7 MMOL/L (ref 3.6–5.5)
PROT SERPL-MCNC: 7.6 G/DL (ref 6–8.2)
RBC # BLD AUTO: 5.26 M/UL (ref 4.2–5.4)
SODIUM SERPL-SCNC: 137 MMOL/L (ref 135–145)
TROPONIN T SERPL-MCNC: <6 NG/L (ref 6–19)
WBC # BLD AUTO: 8.5 K/UL (ref 4.8–10.8)

## 2023-01-06 PROCEDURE — 93005 ELECTROCARDIOGRAM TRACING: CPT | Performed by: EMERGENCY MEDICINE

## 2023-01-06 PROCEDURE — 84484 ASSAY OF TROPONIN QUANT: CPT

## 2023-01-06 PROCEDURE — 93005 ELECTROCARDIOGRAM TRACING: CPT

## 2023-01-06 PROCEDURE — 71045 X-RAY EXAM CHEST 1 VIEW: CPT

## 2023-01-06 PROCEDURE — 80053 COMPREHEN METABOLIC PANEL: CPT

## 2023-01-06 PROCEDURE — 36415 COLL VENOUS BLD VENIPUNCTURE: CPT

## 2023-01-06 PROCEDURE — 99283 EMERGENCY DEPT VISIT LOW MDM: CPT

## 2023-01-06 PROCEDURE — 85025 COMPLETE CBC W/AUTO DIFF WBC: CPT

## 2023-01-06 ASSESSMENT — FIBROSIS 4 INDEX: FIB4 SCORE: 0.37

## 2023-01-07 NOTE — ED PROVIDER NOTES
ER Provider Note    Scribed for Aggie Ventura D.o. by Deepak Candelaria. 2023  7:42 PM    Primary Care Provider: Ivanna Amaral M.D. (Inactive)    CHIEF COMPLAINT  Chief Complaint   Patient presents with    Panic Attack     Pt c/o panic attack followed by pain with inspiration since 3 pm     Chest Wall Pain     Chest wall pain with inspiration      EXTERNAL RECORDS REVIEWED  None    HPI/ROS  LIMITATION TO HISTORY   Select: : None  OUTSIDE HISTORIAN(S):  None    Viki Foster is a 37 y.o. female who presents to the ED complaining of moderate chest pain onset today at 3:00 PM. The patient states that while interacting with one of her children earlier today she became increasingly agitated to the point of experiencing a panic attack. Promptly following the panic attack she began experiencing chest pain which motivated her to visit the ED for further evaluation.  Viki notes that the chest pain has since subsided since arriving to the ED. Associated symptoms include pain with inspiration and headache. The patient denies any shortness of breath. No alleviating or exacerbating factors noted.     PAST MEDICAL HISTORY  Past Medical History:   Diagnosis Date    Psychiatric disorder     bipolar       SURGICAL HISTORY  Past Surgical History:   Procedure Laterality Date    HYSTERECTOMY, VAGINAL      [facility]      x 3       FAMILY HISTORY  Family History   Problem Relation Age of Onset    No Known Problems Mother     No Known Problems Sister     No Known Problems Brother        SOCIAL HISTORY   reports that she has quit smoking. Her smoking use included cigarettes. She has a 1.00 pack-year smoking history. She has never used smokeless tobacco. She reports current alcohol use. She reports current drug use. Drug: Marijuana.    CURRENT MEDICATIONS  Previous Medications    No medications on file     ALLERGIES  Latex    PHYSICAL EXAM  BP (!) 143/82   Pulse (!) 105   Temp 35.9 °C (96.7 °F) (Temporal)   Resp  16   Wt 95.1 kg (209 lb 10.5 oz)   SpO2 100%   BMI 30.96 kg/m²     Constitutional: Patient is well developed, well nourished. Non-toxic appearing. No acute distress.   HENT: Normocephalic, atraumatic. Oropharynx moist without erythema or exudates.  Eyes: PERRL, EOMI, Conjunctiva without erythema   Neck: Supple   Lymphatic: No lymphadenopathy noted.   Cardiovascular: Normal heart rate and Regular rhythm. No murmur  Thorax & Lungs: Clear and equal breath sounds with good excursion. No respiratory distress  Abdomen: Bowel sounds normal in all four quadrants. Soft,nontender, no rebound , guarding, palpable masses.   Skin: Warm, Dry, No rashes.   Extremities: Peripheral pulses 4/4   Musculoskeletal: Normal range of motion in all major joints. No tenderness to palpation or major deformities noted.   Neurologic: Alert & oriented x 3, Normal motor function, Normal sensory function  Psychiatric: Affect anxious.    DIAGNOSTIC STUDIES    Labs:   Results for orders placed or performed during the hospital encounter of 01/06/23   CBC with Differential   Result Value Ref Range    WBC 8.5 4.8 - 10.8 K/uL    RBC 5.26 4.20 - 5.40 M/uL    Hemoglobin 15.2 12.0 - 16.0 g/dL    Hematocrit 44.9 37.0 - 47.0 %    MCV 85.4 81.4 - 97.8 fL    MCH 28.9 27.0 - 33.0 pg    MCHC 33.9 33.6 - 35.0 g/dL    RDW 40.4 35.9 - 50.0 fL    Platelet Count 324 164 - 446 K/uL    MPV 8.7 (L) 9.0 - 12.9 fL    Neutrophils-Polys 58.90 44.00 - 72.00 %    Lymphocytes 30.50 22.00 - 41.00 %    Monocytes 6.50 0.00 - 13.40 %    Eosinophils 3.20 0.00 - 6.90 %    Basophils 0.40 0.00 - 1.80 %    Immature Granulocytes 0.50 0.00 - 0.90 %    Nucleated RBC 0.00 /100 WBC    Neutrophils (Absolute) 5.03 2.00 - 7.15 K/uL    Lymphs (Absolute) 2.60 1.00 - 4.80 K/uL    Monos (Absolute) 0.55 0.00 - 0.85 K/uL    Eos (Absolute) 0.27 0.00 - 0.51 K/uL    Baso (Absolute) 0.03 0.00 - 0.12 K/uL    Immature Granulocytes (abs) 0.04 0.00 - 0.11 K/uL    NRBC (Absolute) 0.00 K/uL   Complete  Metabolic Panel (CMP)   Result Value Ref Range    Sodium 137 135 - 145 mmol/L    Potassium 3.7 3.6 - 5.5 mmol/L    Chloride 102 96 - 112 mmol/L    Co2 26 20 - 33 mmol/L    Anion Gap 9.0 7.0 - 16.0    Glucose 106 (H) 65 - 99 mg/dL    Bun 11 8 - 22 mg/dL    Creatinine 0.67 0.50 - 1.40 mg/dL    Calcium 9.7 8.5 - 10.5 mg/dL    AST(SGOT) 16 12 - 45 U/L    ALT(SGPT) 16 2 - 50 U/L    Alkaline Phosphatase 90 30 - 99 U/L    Total Bilirubin 0.2 0.1 - 1.5 mg/dL    Albumin 4.6 3.2 - 4.9 g/dL    Total Protein 7.6 6.0 - 8.2 g/dL    Globulin 3.0 1.9 - 3.5 g/dL    A-G Ratio 1.5 g/dL   Troponins NOW   Result Value Ref Range    Troponin T <6 6 - 19 ng/L   CORRECTED CALCIUM   Result Value Ref Range    Correct Calcium 9.2 8.5 - 10.5 mg/dL   ESTIMATED GFR   Result Value Ref Range    GFR (CKD-EPI) 116 >60 mL/min/1.73 m 2   EKG   Result Value Ref Range    Report       [facility]    Test Date:  2023  Pt Name:    JOSEPH TY               Department: ER  MRN:        [id number]                      Room:  Gender:     Female                       Technician: [id number]  :        1985                   Requested By:ER TRIAGE PROTOCOL  Order #:    [id number]                    Reading MD:    Measurements  Intervals                                Axis  Rate:       91                           P:          50  AR:         165                          QRS:        -35  QRSD:       97                           T:          32  QT:         364  QTc:        448    Interpretive Statements  Sinus rhythm  Left axis deviation  Compared to ECG 2022 15:35:11  No significant changes       EK Lead EKG interpreted by me as noted above.     Radiology:   DX-CHEST-PORTABLE (1 VIEW)   Final Result         Hazy opacity in the left lingula, atelectasis or infection.      The attending emergency physician has independently interpreted the diagnostic imaging associated with this visit and am waiting the final  reading from the radiologist.        COURSE & MEDICAL DECISION MAKING     ED Observation Status? No; Patient does not meet criteria for ED Observation.     7:42 PM - Patient was seen and evaluated at bedside. Ordered EKG, Troponins NOW, CMP, CBC with diff, Corrected Calcium, DX-Chest to evaluate. Discussed lab and imaging results with the patient and they were grossly reassuring without any indication of acute cardiopulmonary abnormalities. Advised the patient to set boundaries with her child and to keep a brown paper bag on hand for any future panic attacks. Discussed plan for discharge; I advised the patient to follow-up with [facility] in 2 days as needed, and to return to the [facility] ED with any new or worsening symptoms. Patient was given the opportunity for questions. I addressed all questions or concerns at this time and they verbalize agreement to the plan of care.     INITIAL ASSESSMENT AND PLAN  Care Narrative: Upon examining the patient her symptoms had completely resolved.  All of her labs were drawn prior to my evaluation and they were all negative.  Chest x-ray shows no acute cardiopulmonary disease.  Since the patient has no symptoms at this time and she believes it was all due to the reaction that she had to her 15-year-old son she asked if she could just go home.  I agreed since I do not think there is anything significant going on.  I did explain to her how to use a brown paper bag over her mouth and nose should she experience hyperventilation again and that she needs to just set some boundaries and walk away before she gets her self so worked up.  She is to follow-up with her primary care provider within the week for recheck and return if problems.  She is stable upon discharge.    ADDITIONAL PROBLEM LIST AND DISPOSITION    Discussion of management with other [facility] or appropriate source(s): Select: None       Patient will be discharged home in stable condition.    FOLLOW UP:  [facility]  [address] [address]  [phone]  Schedule an appointment as soon as possible for a visit in 2 days  As needed, If symptoms worsen      FINAL DIAGNOSIS  1. Chest wall pain    2. Panic attack    3. Stress reaction            Deepak MUJICA (Scribe), am scribing for, and in the presence of, Aggie Ventura D.O..    Electronically signed by: Deepak Candelaria (Scribe), 1/6/2023    IAggie D.O. personally performed the services described in this documentation, as scribed by Deepak Candelaria in my presence, and it is both accurate and complete.    The note accurately reflects work and decisions made by me.  Aggie Ventura D.O.  1/7/2023  6:43 PM

## 2023-01-07 NOTE — ED TRIAGE NOTES
Pt to triage .  Chief Complaint   Patient presents with    Panic Attack     Pt c/o panic attack followed by pain with inspiration since 3 pm     Chest Wall Pain     Chest wall pain with inspiration

## 2023-01-07 NOTE — ED NOTES
Pt ambulated from lobby to [address] without assistance, tolerated well. Pt is now sitting up in bed with even and unlabored breaths, in no apparent distress at this time. Will continue to monitor pt while awaiting orders.

## 2023-01-07 NOTE — DISCHARGE INSTRUCTIONS
Make sure that you set boundaries with your teenage son, take time to yourself even if you have to lock yourself in your bedroom for an hour and take a bath and read a book.  Get a brown paper bag and place it over your nose and mouth if you start to hyperventilate and breathe in through your nose and out through your mouth slowly to retain your CO2 so that you do not have spasms, numbness and possibly pass out.

## 2023-02-09 ENCOUNTER — HOSPITAL ENCOUNTER (EMERGENCY)
Facility: MEDICAL CENTER | Age: 38
End: 2023-02-09
Attending: EMERGENCY MEDICINE
Payer: MEDICAID

## 2023-02-09 VITALS
HEART RATE: 72 BPM | TEMPERATURE: 97 F | HEIGHT: 69 IN | RESPIRATION RATE: 16 BRPM | SYSTOLIC BLOOD PRESSURE: 122 MMHG | WEIGHT: 211.64 LBS | DIASTOLIC BLOOD PRESSURE: 74 MMHG | BODY MASS INDEX: 31.35 KG/M2 | OXYGEN SATURATION: 99 %

## 2023-02-09 DIAGNOSIS — K04.7 DENTAL ABSCESS: ICD-10-CM

## 2023-02-09 PROCEDURE — 700102 HCHG RX REV CODE 250 W/ 637 OVERRIDE(OP): Performed by: EMERGENCY MEDICINE

## 2023-02-09 PROCEDURE — A9270 NON-COVERED ITEM OR SERVICE: HCPCS | Performed by: EMERGENCY MEDICINE

## 2023-02-09 PROCEDURE — 99283 EMERGENCY DEPT VISIT LOW MDM: CPT

## 2023-02-09 RX ORDER — HYDROCODONE BITARTRATE AND ACETAMINOPHEN 5; 325 MG/1; MG/1
1 TABLET ORAL ONCE
Status: COMPLETED | OUTPATIENT
Start: 2023-02-09 | End: 2023-02-09

## 2023-02-09 RX ORDER — AMOXICILLIN AND CLAVULANATE POTASSIUM 875; 125 MG/1; MG/1
1 TABLET, FILM COATED ORAL 2 TIMES DAILY
Qty: 20 TABLET | Refills: 0 | Status: ACTIVE | OUTPATIENT
Start: 2023-02-09 | End: 2023-02-19

## 2023-02-09 RX ORDER — HYDROCODONE BITARTRATE AND ACETAMINOPHEN 5; 325 MG/1; MG/1
1 TABLET ORAL EVERY 6 HOURS PRN
Qty: 15 TABLET | Refills: 0 | Status: SHIPPED | OUTPATIENT
Start: 2023-02-09 | End: 2023-02-12

## 2023-02-09 RX ADMIN — HYDROCODONE BITARTRATE AND ACETAMINOPHEN 1 TABLET: 5; 325 TABLET ORAL at 12:31

## 2023-02-09 ASSESSMENT — FIBROSIS 4 INDEX: FIB4 SCORE: 0.43

## 2023-02-09 ASSESSMENT — PAIN DESCRIPTION - PAIN TYPE: TYPE: ACUTE PAIN

## 2023-02-09 NOTE — ED NOTES
Patient was educated on discharge instructions.  Patient was informed about diagnosis, symptom management, risks, and home care instructions.  Patient verbalized understanding and signed discharge instructions. Prescriptions sent to pharmacy. Copy of discharge instructions in chart.  Patient ambulated out with steady gait.  Patient has personal belongings. Consent for opioids signed.

## 2023-02-09 NOTE — ED NOTES
Pt ambulated with a normal, steady gait to the room from the lobby.   Agree with triage note. Chart up for ERP.

## 2023-02-09 NOTE — DISCHARGE INSTRUCTIONS
Call the [facility] at [phone]  Call Dr. Tijerina at [phone]    Return to the ER for difficulty swallowing, difficulty breathing, fever, increased swelling, or other concerns
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
## 2023-02-09 NOTE — ED TRIAGE NOTES
"Chief Complaint   Patient presents with    Tooth Abscess     Pt reports a tooth abscess x 3 days to her left lower jaw. Pt reports no dental insurance, given dental information sheet. Pt states she was wondering if she could get antibiotics while she is here. Pt reports a fever yesterday that was relieved by ibuprofen.      /83   Pulse 74   Temp 36.4 °C (97.5 °F) (Temporal)   Resp 18   Ht 1.753 m (5' 9\")   Wt 96 kg (211 lb 10.3 oz)   SpO2 100%   BMI 31.25 kg/m²     Pt is ambulatory in and out of triage. Appropriate PPE worn throughout entire encounter. Pt placed back in the lobby and educated about triage process.    "

## 2023-03-06 ENCOUNTER — HOSPITAL ENCOUNTER (EMERGENCY)
Facility: MEDICAL CENTER | Age: 38
End: 2023-03-06
Attending: EMERGENCY MEDICINE
Payer: MEDICAID

## 2023-03-06 VITALS
OXYGEN SATURATION: 98 % | WEIGHT: 211 LBS | SYSTOLIC BLOOD PRESSURE: 125 MMHG | RESPIRATION RATE: 18 BRPM | HEIGHT: 69 IN | BODY MASS INDEX: 31.25 KG/M2 | HEART RATE: 90 BPM | DIASTOLIC BLOOD PRESSURE: 80 MMHG | TEMPERATURE: 98.4 F

## 2023-03-06 DIAGNOSIS — K62.89 RECTAL PAIN: ICD-10-CM

## 2023-03-06 DIAGNOSIS — K64.4 INFLAMED EXTERNAL HEMORRHOID: ICD-10-CM

## 2023-03-06 PROCEDURE — 700101 HCHG RX REV CODE 250: Performed by: EMERGENCY MEDICINE

## 2023-03-06 PROCEDURE — 99282 EMERGENCY DEPT VISIT SF MDM: CPT

## 2023-03-06 RX ORDER — HYDROCORTISONE ACETATE 25 MG/1
25 SUPPOSITORY RECTAL EVERY 12 HOURS
Qty: 20 SUPPOSITORY | Refills: 0 | Status: SHIPPED | OUTPATIENT
Start: 2023-03-06

## 2023-03-06 RX ADMIN — Medication 3 ML: at 14:00

## 2023-03-06 ASSESSMENT — FIBROSIS 4 INDEX: FIB4 SCORE: 0.43

## 2023-03-06 NOTE — ED NOTES
DC instructions reviewed with pt. Pt verbalized understanding. Pt ambulated to [facility] with steady gait.

## 2023-03-06 NOTE — ED TRIAGE NOTES
Pt amb to triage.  Chief Complaint   Patient presents with    Rectal Pain    Hemorrhoids     Symptoms x3d. +bleeding.

## 2023-03-06 NOTE — ED PROVIDER NOTES
"ER Provider Note    Scribed for Austin Dc D.O. by Ji Higgins. 3/6/2023  1:20 PM    Primary Care Provider: Pcp Pt States None    CHIEF COMPLAINT  Chief Complaint   Patient presents with    Rectal Pain    Hemorrhoids       HPI/ROS  LIMITATION TO HISTORY   None  OUTSIDE HISTORIAN(S):  None    Viki Foster is a 37 y.o. female who presents to the Emergency Department for evaluation of acute rectal pain onset three days ago. Patient states that her pain is \"so painful that I can't even sit down.\" Patient states that she has a history of hemorrhoids, and believes she has them again. Patient then presents to the ED for further evaluation. Patient has associated rectal bleeding. She states that her bleeding has not stopped for the past two days. Denies any abdominal pain, dizziness, or lightheadedness. No alleviating factors reported. No known drug allergies.     ROS as per HPI.    PAST MEDICAL HISTORY  Past Medical History:   Diagnosis Date    Psychiatric disorder     bipolar       SURGICAL HISTORY  Past Surgical History:   Procedure Laterality Date    HYSTERECTOMY, VAGINAL      [place]      x 3       FAMILY HISTORY  Family History   Problem Relation Age of Onset    No Known Problems Mother     No Known Problems Sister     No Known Problems Brother        SOCIAL HISTORY   reports that she has quit smoking. Her smoking use included cigarettes. She has a 1.00 pack-year smoking history. She has never used smokeless tobacco. She reports current alcohol use. She reports current drug use. Drug: Marijuana.    CURRENT MEDICATIONS  None noted    ALLERGIES  Latex    PHYSICAL EXAM  /84   Pulse 93   Temp 35.9 °C (96.7 °F) (Temporal)   Resp 18   Ht 1.753 m (5' 9\")   Wt 95.7 kg (211 lb)   SpO2 99%   BMI 31.16 kg/m²     General: No acute distress.  HENT: Normocephalic, Mucus membranes are moist.   Chest: Lungs have even and unlabored respirations, Clear to auscultation.   Cardiovascular: " Regular rate and regular rhythm, No peripheral cyanosis.  Abdomen: Non distended.  Neuro: Awake, Conversive, Able to relay recent events.  Psychiatric: Calm and cooperative.   Rectal Exam: single hemorrhoid that is oozing a small amount of blood. It is tender and inflamed, not thrombosed.    EXTERNAL RECORDS REVIEWED  No ER visits in the past for hemorrhoids     INITIAL ASSESSMENT  Patient presents with an bleeding, inflamed hemorrhoid. We will treat with lidocaine with epinephrine for temporary relief. We will discharge her home with steroid cream.     ED Observation Status? Yes; I am placing the patient in to an observation status due to a diagnostic uncertainty as well as therapeutic intensity. Patient placed in observation status at 1:24 PM, 3/6/2023.     Observation plan is as follows: treat pain and bleeding, and reevalute     Upon Reevaluation, the patient's condition has: Improved; and will be discharged.    Patient discharged from ED Observation status at 1:40 PM (Time) 3/6/23 (Date).       COURSE & MEDICAL DECISION MAKING     COURSE AND PLAN  1:20 PM - Patient seen and examined at bedside. Discussed plan of care, including placing LET gel on her hemorrhoid to provide pain relief. Patient agrees to the plan of care. The patient will be medicated with LET gel 3 mL.     1:40 PM - Went to patient's room to apply LET gel, however, patient declined at this time. She states that she does not want to wait for the LET gel to work as she has to  her kids from school. I recommended Tylenol and Ibuprofen for the pain as needed, and will be prescribing hydrocortisone. I also informed her that I will refer her to General Surgery so they can evaluate her symptoms and possibly cut out her hemorrhoid so it does not cause future problems. Patient understands and verbalizes agreement to plan of care. I then informed the patient of my plan for discharge, which includes strict return precautions for any new or worsening  symptoms. Patient understands and verbalizes agreement to plan of care. Patient is comfortable going home at this time.      ED Summary: This patient presents with inflamed hemorrhoid with mild bleeding.  Attempts were going to be to put L ET to the area to decrease swelling, bleeding and pain, she was in a hurry and would not wait for this treatment so she is discharged home with Anusol HC and a referral to general surgery for evaluation for possible removal.    Decision tools and prescription drugs considered including, but not limited to: hydrocortisone    HTN/IDDM FOLLOW UP:  The patient is referred to a primary physician for blood pressure management, diabetic screening, and for all other preventive health concerns    DISPOSITION AND DISCUSSIONS    Patient will be discharged home.    FOLLOW UP:  No follow-up provider specified.    OUTPATIENT MEDICATIONS:  Discharge Medication List as of 3/6/2023  2:16 PM        START taking these medications    Details   hydrocortisone (ANUSOL-HC) 25 MG Suppos Insert 1 Suppository into the rectum every 12 hours., Disp-20 Suppository, R-0, Normal              FINAL DIAGNOSIS  1. Inflamed external hemorrhoid    2. Rectal pain         Ji MUJICA (Ginny), am scribing for, and in the presence of, Austin Dc D.O..    Electronically signed by: Ji Higgins (Ginny), 3/6/2023    Austin MUJICA D.O. personally performed the services described in this documentation, as scribed by Ji Higgins in my presence, and it is both accurate and complete.    The note accurately reflects work and decisions made by me.  Austin Dc D.O.  3/6/2023  3:07 PM

## 2023-03-06 NOTE — DISCHARGE INSTRUCTIONS
Use Tylenol for pain, use the suppository as prescribed.  Referral has been placed for surgery so they can evaluate and possibly cutting this out so it does not cause problems in the future.

## 2025-08-15 ENCOUNTER — OFFICE VISIT (OUTPATIENT)
Dept: URGENT CARE | Facility: PHYSICIAN GROUP | Age: 40
End: 2025-08-15
Payer: MEDICAID

## 2025-08-15 ENCOUNTER — HOSPITAL ENCOUNTER (OUTPATIENT)
Dept: RADIOLOGY | Facility: MEDICAL CENTER | Age: 40
End: 2025-08-15
Payer: MEDICAID

## 2025-08-15 VITALS
HEART RATE: 80 BPM | DIASTOLIC BLOOD PRESSURE: 64 MMHG | SYSTOLIC BLOOD PRESSURE: 100 MMHG | HEIGHT: 69 IN | OXYGEN SATURATION: 98 % | TEMPERATURE: 96.8 F | BODY MASS INDEX: 29.58 KG/M2 | WEIGHT: 199.74 LBS | RESPIRATION RATE: 16 BRPM

## 2025-08-15 DIAGNOSIS — M79.642 PAIN OF LEFT HAND: ICD-10-CM

## 2025-08-15 DIAGNOSIS — T14.90XA TRAUMA: ICD-10-CM

## 2025-08-15 DIAGNOSIS — M79.642 PAIN OF LEFT HAND: Primary | ICD-10-CM

## 2025-08-15 PROCEDURE — 3078F DIAST BP <80 MM HG: CPT

## 2025-08-15 PROCEDURE — 3074F SYST BP LT 130 MM HG: CPT

## 2025-08-15 PROCEDURE — 99203 OFFICE O/P NEW LOW 30 MIN: CPT

## 2025-08-15 PROCEDURE — 73110 X-RAY EXAM OF WRIST: CPT | Mod: LT

## 2025-08-15 PROCEDURE — 73130 X-RAY EXAM OF HAND: CPT | Mod: LT

## 2025-08-15 ASSESSMENT — ENCOUNTER SYMPTOMS
EYE DISCHARGE: 0
VOMITING: 0
BLOOD IN STOOL: 0
DIARRHEA: 0
COUGH: 0
WHEEZING: 0
CONSTIPATION: 0
FEVER: 0
EYE REDNESS: 0
BRUISES/BLEEDS EASILY: 0